# Patient Record
Sex: FEMALE | Race: BLACK OR AFRICAN AMERICAN | NOT HISPANIC OR LATINO | URBAN - METROPOLITAN AREA
[De-identification: names, ages, dates, MRNs, and addresses within clinical notes are randomized per-mention and may not be internally consistent; named-entity substitution may affect disease eponyms.]

---

## 2023-11-06 ENCOUNTER — EMERGENCY (EMERGENCY)
Facility: HOSPITAL | Age: 33
LOS: 0 days | Discharge: HOME | End: 2023-11-06
Payer: COMMERCIAL

## 2023-11-06 VITALS
WEIGHT: 134.04 LBS | RESPIRATION RATE: 14 BRPM | HEIGHT: 63 IN | OXYGEN SATURATION: 96 % | TEMPERATURE: 98 F | DIASTOLIC BLOOD PRESSURE: 69 MMHG | HEART RATE: 117 BPM | SYSTOLIC BLOOD PRESSURE: 116 MMHG

## 2023-11-06 VITALS
HEART RATE: 97 BPM | OXYGEN SATURATION: 99 % | RESPIRATION RATE: 16 BRPM | TEMPERATURE: 98 F | SYSTOLIC BLOOD PRESSURE: 113 MMHG | DIASTOLIC BLOOD PRESSURE: 81 MMHG

## 2023-11-06 DIAGNOSIS — L02.31 CUTANEOUS ABSCESS OF BUTTOCK: ICD-10-CM

## 2023-11-06 PROCEDURE — 10060 I&D ABSCESS SIMPLE/SINGLE: CPT

## 2023-11-06 PROCEDURE — 99284 EMERGENCY DEPT VISIT MOD MDM: CPT | Mod: 25

## 2023-11-06 PROCEDURE — 93010 ELECTROCARDIOGRAM REPORT: CPT

## 2023-11-06 RX ORDER — IBUPROFEN 200 MG
600 TABLET ORAL ONCE
Refills: 0 | Status: COMPLETED | OUTPATIENT
Start: 2023-11-06 | End: 2023-11-06

## 2023-11-06 RX ORDER — CEPHALEXIN 500 MG
1 CAPSULE ORAL
Qty: 28 | Refills: 0
Start: 2023-11-06 | End: 2023-11-12

## 2023-11-06 RX ORDER — ACETAMINOPHEN 500 MG
975 TABLET ORAL ONCE
Refills: 0 | Status: COMPLETED | OUTPATIENT
Start: 2023-11-06 | End: 2023-11-06

## 2023-11-06 RX ADMIN — Medication 975 MILLIGRAM(S): at 19:27

## 2023-11-06 RX ADMIN — Medication 600 MILLIGRAM(S): at 19:27

## 2023-11-06 NOTE — ED ADULT NURSE NOTE - SKIN TEMPERATURE MOISTURE
Checked [place] . Refilled [facility] on 09/09/2019    
Pt requesting refill:  [facility]    Last office visit:  7/22/19 (annual)  Next visit:  10/23/19 (3 mo f/u)  Recall set for annual July 2020    [place] set for escript        
warm

## 2023-11-06 NOTE — ED PROVIDER NOTE - NSFOLLOWUPINSTRUCTIONS_ED_ALL_ED_FT
- Please follow up with your Primary Care Doctor in 2-3 days, bring a copy of your results to follow up appointment.     - For Pain  Tylenol (acetaminophen) 1000mg every 6-8 hours as needed and/or over the counter Motrin (Ibuprofen/Advil) 400-600mg every 6 hours as needed, take with food.     - Take antibiotics as directed. You may also take warm baths or use warm compresses to allow for continued drainage of abscess.    - Return to ED for any concern listed below:  Increased pain, redness, fever, chills, any new or worsened symptoms.                                                                 Skin Abscess    Close-up of an abscess on the skin.  A skin abscess is an infected area of your skin that contains pus and other material. An abscess can happen in any part of your body. Some abscesses break open (rupture) on their own. Most continue to get worse unless they are treated. The infection can spread deeper into the body and into your blood, which can make you feel sick.    A skin abscess is caused by germs that enter the skin through a cut or scrape. It can also be caused by blocked oil and sweat glands or infected hair follicles.    This condition is usually treated by:  Draining the pus.  Taking antibiotic medicines.  Placing a warm, wet washcloth over the abscess.  Follow these instructions at home:  Medicines    A prescription pill bottle with an example of a pill.  Take over-the-counter and prescription medicines only as told by your doctor.  If you were prescribed an antibiotic medicine, take it as told by your doctor. Do not stop taking the antibiotic even if you start to feel better.  Abscess care    Washing hands with soap and water.  If you have an abscess that has not drained, place a warm, clean, wet washcloth over the abscess several times a day. Do this as told by your doctor.  Follow instructions from your doctor about how to take care of your abscess. Make sure you:  Cover the abscess with a bandage (dressing).  Change your bandage or gauze as told by your doctor.  Wash your hands with soap and water before you change the bandage or gauze. If you cannot use soap and water, use hand .  Check your abscess every day for signs that the infection is getting worse. Check for:  More redness, swelling, or pain.  More fluid or blood.  Warmth.  More pus or a bad smell.  General instructions    To avoid spreading the infection:  Do not share personal care items, towels, or hot tubs with others.  Avoid making skin-to-skin contact with other people.  Keep all follow-up visits as told by your doctor. This is important.  Contact a doctor if:  You have more redness, swelling, or pain around your abscess.  You have more fluid or blood coming from your abscess.  Your abscess feels warm when you touch it.  You have more pus or a bad smell coming from your abscess.  Your muscles ache.  You feel sick.  Get help right away if:  You have very bad (severe) pain.  You see red streaks on your skin spreading away from the abscess.  You see redness that spreads quickly.  You have a fever or chills.  Summary  A skin abscess is an infected area of your skin that contains pus and other material.  The abscess is caused by germs that enter the skin through a cut or scrape. It can also be caused by blocked oil and sweat glands or infected hair follicles.  Follow your doctor's instructions on caring for your abscess, taking medicines, preventing infections, and keeping follow-up visits.

## 2023-11-06 NOTE — ED PROVIDER NOTE - CLINICAL SUMMARY MEDICAL DECISION MAKING FREE TEXT BOX
33 yr old female w/ no PMHx presenting w/ left buttock abscess lateral to intergluteal cleft. 6cm Indurated site with fluctuance, warmth. No fever, streaking or crepitus. Will use US to differentiate from lymph node or cellulitis. Will I&D and Dispo home on abx. 33 yr old female w/ no PMHx presenting w/ left buttock abscess lateral to intergluteal cleft. 3cm Indurated site with fluctuance, warmth. No fever, streaking or crepitus. Will use US to differentiate from lymph node or cellulitis. Will I&D and Dispo home on abx.

## 2023-11-06 NOTE — ED PROVIDER NOTE - PROGRESS NOTE DETAILS
HANNAH RUVALCABA: I was directly involved in the management of this case. Discussed case with PA fellow.   Reviewed necessity for follow up. Counseled on red flags and to return for them.  Patient appears well on discharge.

## 2023-11-06 NOTE — ED PROVIDER NOTE - PHYSICAL EXAMINATION
GEN: Pt in NAD, A&O x3. GCS 15  RESP: No chest wall tenderness, CTA b/l, no wheezes, rales, or rhonchi.   CARDIAC: RRR, clear distinct S1, S2, no murmurs, gallops, or rubs.   ABD: Abdomen non-distended, soft, non-tender, no rebound or guarding. No CVAT b/l. No RUQ, LUQ, RLQ, LLQ, Epigastric, or Suprapubic TTP. (-) Rovsing, (-) Obturator, (-) Psoas, (-) McBurney's, (-) Davidson's Sign.  VASC: 2+ radial pulses b/l. No edema or tenderness of the lower extremities.  SKIN: 6cm indurated abscess located in the left gluteal fold by the intergluteal cleft associated to pain, swelling, and warmth associated to edema in surrounding tissue. It has/hasn't spontaneously drained. GEN: Pt in NAD, A&O x3. GCS 15  RESP: No chest wall tenderness, CTA b/l, no wheezes, rales, or rhonchi.   CARDIAC: RRR, clear distinct S1, S2, no murmurs, gallops, or rubs.   ABD: Abdomen non-distended, soft, non-tender, no rebound or guarding. No CVAT b/l. No RUQ, LUQ, RLQ, LLQ, Epigastric, or Suprapubic TTP. (-) Rovsing, (-) Obturator, (-) Psoas, (-) McBurney's, (-) Davidson's Sign.  VASC: 2+ radial pulses b/l. No edema or tenderness of the lower extremities.  SKIN: 6cm indurated abscess located in the left buttock, lateral to the intergluteal cleft associated to pain and warmth, tender, fluctuant. It has not spontaneously drained. GEN: Pt in NAD, A&O x3. GCS 15  RESP: No chest wall tenderness, CTA b/l, no wheezes, rales, or rhonchi.   CARDIAC: RRR, clear distinct S1, S2, no murmurs, gallops, or rubs.   ABD: Abdomen non-distended, soft, non-tender, no rebound or guarding. No CVAT b/l. No RUQ, LUQ, RLQ, LLQ, Epigastric, or Suprapubic TTP. (-) Rovsing, (-) Obturator, (-) Psoas, (-) McBurney's, (-) Davidson's Sign.  VASC: 2+ radial pulses b/l. No edema or tenderness of the lower extremities.  SKIN: 3cm indurated abscess located in the left buttock, lateral to the intergluteal cleft associated to pain and warmth, tender, fluctuant. no discharge. no streaking/crepitus

## 2023-11-06 NOTE — ED ADULT NURSE NOTE - OBJECTIVE STATEMENT
Came in for abscess on left groin area noticed Thursday. Patient reports it has gotten bigger and more painful. Denies any fever. Denies PMH. No bleeding noted. Endorsed to primary RN.

## 2023-11-06 NOTE — ED ADULT NURSE NOTE - NSFALLUNIVINTERV_ED_ALL_ED
Bed/Stretcher in lowest position, wheels locked, appropriate side rails in place/Call bell, personal items and telephone in reach/Instruct patient to call for assistance before getting out of bed/chair/stretcher/Non-slip footwear applied when patient is off stretcher/[place] to call system/Physically safe environment - no spills, clutter or unnecessary equipment/Purposeful proactive rounding/Room/bathroom lighting operational, light cord in reach

## 2023-11-06 NOTE — ED ADULT TRIAGE NOTE - CHIEF COMPLAINT QUOTE
Came in for abscess on left groin area noticed Thursday. Patient reports it has gotten bigger and more painful. Denies any fever.

## 2023-11-06 NOTE — ED PROVIDER NOTE - PATIENT PORTAL LINK FT
You can access the FollowMyHealth Patient Portal offered by [facility] by registering at the following website: [email or web address]. By joining [email or web address] FollowMyHealth portal, you will also be able to view your health information using other applications (apps) compatible with our system.

## 2023-11-06 NOTE — ED PROVIDER NOTE - OBJECTIVE STATEMENT
GYN
33 yr old female w/ no PMHx presenting w/ abscess in left gluteal fold by the intergluteal cleft for 4 days. Patient is a  and states that she used to shave but hasn't in months. She added that she had been getting ingrown hairs but that this is worse. This has never happened to the patient before. Took Tylenol yesterday with some relief and ibuprofen this morning. Able to ambulate with pain. No HA/ Dizziness, No fever/chills, No chest pain/palpitations, no SOB/cough/wheeze/stridor, No abdominal pain, No N/V/D, no dysuria/frequency/discharge, No neck/back pain, no changes in neurological status/function.

## 2023-11-17 ENCOUNTER — EMERGENCY (EMERGENCY)
Facility: HOSPITAL | Age: 33
LOS: 0 days | Discharge: HOME | End: 2023-11-17
Payer: COMMERCIAL

## 2023-11-17 VITALS
SYSTOLIC BLOOD PRESSURE: 104 MMHG | RESPIRATION RATE: 19 BRPM | HEART RATE: 86 BPM | TEMPERATURE: 100 F | OXYGEN SATURATION: 100 % | DIASTOLIC BLOOD PRESSURE: 55 MMHG

## 2023-11-17 VITALS
SYSTOLIC BLOOD PRESSURE: 103 MMHG | TEMPERATURE: 99 F | WEIGHT: 134.04 LBS | HEIGHT: 63 IN | HEART RATE: 103 BPM | RESPIRATION RATE: 20 BRPM | OXYGEN SATURATION: 100 % | DIASTOLIC BLOOD PRESSURE: 64 MMHG

## 2023-11-17 DIAGNOSIS — N76.89 OTHER SPECIFIED INFLAMMATION OF VAGINA AND VULVA: ICD-10-CM

## 2023-11-17 DIAGNOSIS — N75.0 CYST OF BARTHOLIN'S GLAND: ICD-10-CM

## 2023-11-17 PROCEDURE — 99283 EMERGENCY DEPT VISIT LOW MDM: CPT

## 2023-11-17 NOTE — ED PROVIDER NOTE - PATIENT PORTAL LINK FT
You can access the FollowMyHealth Patient Portal offered by [facility] by registering at the following website: [email or web address]. By joining [employer]’s FollowMyHealth portal, you will also be able to view your health information using other applications (apps) compatible with our system.

## 2023-11-17 NOTE — ED PROVIDER NOTE - GENITOURINARY EXTERNAL GENERAL. FEMALE
+ Small circular area of non-tender swelling to the left lower labia/vulvar vestibule c/w Bartholin's gland cyst, no surrounding fluctuance/induration/erythema/warmth./SWELLING

## 2023-11-17 NOTE — ED ADULT NURSE NOTE - OBJECTIVE STATEMENT
33 yr old female AOx4. C/o abscess on left labia. First noted 2 days ago, but swelling increased since this morning. Pt reports left inguinal lymph node swelling this morning, improved with ibuprofen 600 mg. Pt denies pain, bleeding at site, or drainage. Similar abscess last week. No PMH. Pt denies CP, SOB, N/V/D, fever/chills, h/a, dizziness.

## 2023-11-17 NOTE — ED PROVIDER NOTE - OBJECTIVE STATEMENT
33F with no reported PMH who presents to ED with left-sided labial swelling x this AM. Denies fever, chills, chest pain, shortness of breath, abdominal pain, N/V/D, dysuria, urinary frequency/urgency, extremity weakness/numbness/tingling, lightheadedness, dizziness, or headaches. 33F with no reported PMH who presents to ED with left-sided labial/vaginal swelling x this AM. Pt states that about 1.5 weeks ago, she was evaluated at [facility] ED for left buttock abscess which required I&D. Pt states current symptoms started as a small pimple to the left labia, states area of swelling has been increasing in size but is not painful, pt denies shaving the area recently. Denies fever, chills, chest pain, shortness of breath, abdominal pain, N/V/D, dysuria, urinary frequency/urgency, extremity weakness/numbness/tingling, lightheadedness, dizziness, or headaches. 33F with no reported PMH who presents to ED with left-sided labial/vaginal swelling x this AM. Pt states that about 1.5 weeks ago, she was evaluated at [facility] ED for left buttock abscess which required I&D. Pt states current symptoms started as a small pimple to the left labia/vagina, states area of swelling has been increasing in size but is not painful, pt denies shaving the area recently. Denies fever, chills, chest pain, shortness of breath, abdominal pain, N/V/D, dysuria, urinary frequency/urgency, extremity weakness/numbness/tingling, lightheadedness, dizziness, or headaches.

## 2023-11-17 NOTE — ED PROVIDER NOTE - NSFOLLOWUPINSTRUCTIONS_ED_ALL_ED_FT
Follow-up with your OBGYN appointment as discussed.    Medications  - Take Tylenol (Acetaminophen) 650 mg every 6 hours AND/OR Motrin (Ibuprofen) 600 mg every 8 hours as needed for pain.  - Antibiotics: Bactrim, 1 tablet, two times a day, for 7 days.     Bartholin's Cyst     A Bartholin's cyst is a fluid-filled sac that forms on a Bartholin's gland. Bartholin's glands are small glands in the folds of skin around the vaginal opening (labia). These glands produce a fluid to moisten (lubricate) the outside of the vagina during sex.    A cyst that is not large or infected may not cause any problems or require treatment. If the cyst gets infected, it is called a Bartholin's abscess. An abscess may cause symptoms such as pain and swelling and is more likely to require treatment.    What are the causes?  This condition may be caused by a blocked Bartholin's gland. These glands can become blocked due to natural buildup of fluid and oils. Bacteria inside of the cyst can cause infection.    In many cases, the cause is not known.    What increases the risk?  You may be at increased risk of developing a Bartholin's cyst or abscess if:    You are of childbearing age.  You have a history of Bartholin's cysts or abscesses.  You have diabetes.  You have an STI (sexually transmitted infection).    What are the signs or symptoms?  Symptoms may include:    A bulge or lump on the labia, near the lower opening of the vagina.  Discomfort or pain. This may get worse during sex or when walking.  Redness, swelling, or fluid draining from the area. These may be signs of an abscess.    How severe your symptoms are depends on the size of your cyst and whether it is infected. Infection causes symptoms to get more severe.    How is this diagnosed?  This condition may be diagnosed based on:    Your symptoms and medical history.  A physical exam to check for swelling in your vaginal area. You may lie on your back on an exam table and have your feet placed into footrests for the exam.  Blood tests to check for infections.  Removal of a fluid sample from the cyst or abscess (biopsy) for testing.    You may work with a health care provider who specializes in women's health (gynecologist) for diagnosis and treatment.    How is this treated?  If your cyst is small, not infected, and not causing symptoms, you may not need any treatment. These cysts often go away on their own, with home care such as hot baths or warm compresses.    If you have a large cyst or an abscess, treatment may include:    Antibiotic medicine.  A procedure to drain the fluid inside the cyst or abscess. These procedures involve making an incision in the cyst or abscess so that the fluid drains out, and then one of the following may be done:    A small, thin tube (catheter) may be placed inside the cyst or abscess so that it does not close and fill up with fluid again (fistulization). The catheter will be removed at a follow-up visit.  The edges of the incision may be stitched to your skin so that the cyst or abscess stays open (marsupialization). This allows it to continue to drain and not fill up with fluid again.    If you have cysts or abscesses that keep returning (recurring) and have required incision and drainage multiple times, your health care provider may talk with you about surgery to remove the Bartholin's gland.    Follow these instructions at home:      Medicines    Take over-the-counter and prescription medicines only as told by your health care provider.  If you were prescribed an antibiotic medicine, take it as told by your health care provider. Do not stop taking the antibiotic even if your condition improves.        Managing pain and swelling    Try sitz baths to help with pain and swelling. A sitz bath is a warm water bath in which the water only comes up to your hips and should cover your buttocks. You may take sitz baths several times a day.  Apply heat to the affected area as often as needed. Use the heat source that your health care provider recommends, such as a moist heat pack or a heating pad.     Place a towel between your skin and the heat source.   Leave the heat on for 20–30 minutes.   Remove the heat if your skin turns bright red. This is especially important if you are unable to feel pain, heat, or cold. You may have a greater risk of getting burned.        General instructions    If your cyst or abscess was drained, follow instructions from your health care provider about how to take care of your wound. Use feminine pads as needed to absorb any drainage.  Do not push on or squeeze your cyst.  Do not have sex until the cyst has gone away or your wound from drainage has healed.  Take these steps to help prevent a Bartholin's cyst from returning, and to prevent other Bartholin's cysts from developing:    Take a bath or shower once a day. Clean your vaginal area with mild soap and water when you bathe.  Practice safe sex to prevent STIs. Talk with your health care provider about how to prevent STIs and which forms of birth control (contraception) may be best for you.  Keep all follow-up visits as told by your health care provider. This is important.    Contact a health care provider if:  You have a fever.  You develop redness, swelling, or pain around your cyst.  You have fluid, blood, pus, or a bad smell coming from your cyst.  You have a cyst that gets larger or comes back.    Summary  A Bartholin's cyst is a fluid-filled sac that forms on a Bartholin's gland. These glands are in the folds of skin around the vaginal opening (labia).  If your cyst is small, not infected, and not causing symptoms, you may not need any treatment. These cysts often go away on their own, with home care such as hot baths or warm compresses.  If you have a large cyst or an abscess, your health care provider may perform a procedure to drain the fluid.  If you have cysts or abscesses that keep returning (recurring) and have required incision and drainage multiple times, your health care provider may talk with you about surgery to remove the Bartholin's gland.    ADDITIONAL NOTES AND INSTRUCTIONS    Please follow up with your Primary MD in 24-48 hr.  Seek immediate medical care for any new/worsening signs or symptoms.

## 2024-12-13 NOTE — ED ADULT NURSE NOTE - CAS DISCH BELONGINGS RETURNED
Patient's first and last name, , procedure, and correct site confirmed prior to the start of procedure. Not applicable